# Patient Record
Sex: FEMALE | Race: OTHER | Employment: UNEMPLOYED | URBAN - METROPOLITAN AREA
[De-identification: names, ages, dates, MRNs, and addresses within clinical notes are randomized per-mention and may not be internally consistent; named-entity substitution may affect disease eponyms.]

---

## 2017-06-22 ENCOUNTER — HOSPITAL ENCOUNTER (OUTPATIENT)
Dept: LAB | Age: 57
Discharge: HOME | End: 2017-06-22

## 2017-06-22 ENCOUNTER — OFFICE VISIT (OUTPATIENT)
Dept: FAMILY MEDICINE CLINIC | Age: 57
End: 2017-06-22

## 2017-06-22 VITALS
TEMPERATURE: 97.7 F | HEIGHT: 61 IN | DIASTOLIC BLOOD PRESSURE: 71 MMHG | WEIGHT: 141 LBS | SYSTOLIC BLOOD PRESSURE: 145 MMHG | BODY MASS INDEX: 26.62 KG/M2 | HEART RATE: 49 BPM

## 2017-06-22 DIAGNOSIS — E03.9 ACQUIRED HYPOTHYROIDISM: ICD-10-CM

## 2017-06-22 LAB — TSH SERPL DL<=0.05 MIU/L-ACNC: 2.61 UIU/ML (ref 0.36–3.74)

## 2017-06-22 PROCEDURE — 84443 ASSAY THYROID STIM HORMONE: CPT | Performed by: PHYSICIAN ASSISTANT

## 2017-06-22 RX ORDER — LEVOTHYROXINE SODIUM 88 UG/1
88 TABLET ORAL
Qty: 90 TAB | Refills: 3 | Status: SHIPPED | OUTPATIENT
Start: 2017-06-22

## 2017-06-22 NOTE — PROGRESS NOTES
Patient seen for discharge with the assistance of sidney Moser. We reviewed the AVS, prescription, pharmacy location and the provider's recommendation that the patient check her blood pressure at least monthly and return to the [facility] if it is near 160/90 or if she has any other symptoms or concerns. As requested, the patient was given the provider's written list of blood pressure readings. The patient understands to return to a [facility] clinic at least yearly for follow-up.  Skinny Fuentes RN

## 2017-06-22 NOTE — PROGRESS NOTES
Assessment/Plan:    Lorren Dubin was seen today for thyroid problem. Diagnoses and all orders for this visit:    Acquired hypothyroidism  -     TSH 3RD GENERATION; Future  -     levothyroxine (LEVOTHROID) 88 mcg tablet; Take 1 Tab by mouth Daily (before breakfast). gregg 1 tableta por la boca diaria antes de desayuno        Follow-up Disposition:  Return in about 1 year (around 6/22/2018), or if symptoms worsen or fail to improve. NICKOLAS Christensen expressed understanding of this plan. An AVS was printed and given to the patient.      ----------------------------------------------------------------------    Chief Complaint   Patient presents with    Thyroid Problem     med refill       History of Present Illness:  Pt here for refill of her thyroid medication. She has not run out of her meds. She is not having dry skin, constipation, fatigue, weight loss. She is having some hair loss but it seems to be mild in nature. She is exercising daily. She is eating well. Her bp is borderline today and she will keep an eye on it by having it checked at the pharmacy or by buying a bp cuff      Past Medical History:   Diagnosis Date    Cancer [facility])     Cervical with radiation    GERD (gastroesophageal reflux disease)     Hypothyroidism     Liver disease     cyst    Pap smear for cervical cancer screening 2010    Normal       Current Outpatient Prescriptions   Medication Sig Dispense Refill    levothyroxine (LEVOTHROID) 88 mcg tablet Take 1 Tab by mouth Daily (before breakfast). gregg 1 tableta por la boca diaria antes de desayuno 90 Tab 3    naproxen (NAPROSYN) 500 mg tablet Take 1 Tab by mouth two (2) times daily (with meals).  61 Tab 0       No Known Allergies    Social History   Substance Use Topics    Smoking status: Never Smoker    Smokeless tobacco: None    Alcohol use No       Family History   Problem Relation Age of Onset    Cancer Maternal Grandfather      84yo with colon cancer    Cancer Other      maternal uncle-colon cancer at 17yo    Cancer Mother 68     stomach cancer    Asthma Mother     Heart Disease Mother     Hypertension Mother    Leighann Todd Elevated Lipids Father     Headache Father     Cancer Maternal Uncle     Bleeding Prob Neg Hx     Alcohol abuse Neg Hx     Arthritis-osteo Neg Hx     Diabetes Neg Hx     Migraines Neg Hx     Psychiatric Disorder Neg Hx     Lung Disease Neg Hx     Stroke Neg Hx     Mental Retardation Neg Hx        Physical Exam:     Visit Vitals    /71 (BP 1 Location: Right arm)    Pulse (!) 49    Temp 97.7 °F (36.5 °C) (Oral)    Ht 5' 0.63\" (1.54 m)    Wt 141 lb (64 kg)    LMP 11/22/2012    BMI 26.97 kg/m2     gen looks well, pleasant  A&Ox3  WDWN NAD  Respirations normal and non labored  Lab Results   Component Value Date/Time    TSH 0.93 10/15/2015 12:35 PM

## 2017-06-22 NOTE — PATIENT INSTRUCTIONS
Learning About High Blood Pressure  What is high blood pressure? Blood pressure is a measure of how hard the blood pushes against the walls of your arteries. It's normal for blood pressure to go up and down throughout the day, but if it stays up, you have high blood pressure. Another name for high blood pressure is hypertension. Two numbers tell you your blood pressure. The first number is the systolic pressure. It shows how hard the blood pushes when your heart is pumping. The second number is the diastolic pressure. It shows how hard the blood pushes between heartbeats, when your heart is relaxed and filling with blood. A blood pressure of less than 120/80 (say \"120 over 80\") is ideal for an adult. High blood pressure is 140/90 or higher. You have high blood pressure if your top number is 140 or higher or your bottom number is 90 or higher, or both. Many people fall into the category in between, called prehypertension. People with prehypertension need to make lifestyle changes to bring their blood pressure down and help prevent or delay high blood pressure. What happens when you have high blood pressure? · Blood flows through your arteries with too much force. Over time, this damages the walls of your arteries. But you can't feel it. High blood pressure usually doesn't cause symptoms. · Fat and calcium start to build up in your arteries. This buildup is called plaque. Plaque makes your arteries narrower and stiffer. Blood can't flow through them as easily. · This lack of good blood flow starts to damage some of the organs in your body. This can lead to problems such as coronary artery disease and heart attack, heart failure, stroke, kidney failure, and eye damage. How can you prevent high blood pressure? · Stay at a healthy weight. · Try to limit how much sodium you eat to less than 2,300 milligrams (mg) a day.  If you limit your sodium to 1,500 mg a day, you can lower your blood pressure even more.  ¨ Buy foods that are labeled \"unsalted,\" \"sodium-free,\" or \"low-sodium. \" Foods labeled \"reduced-sodium\" and \"light sodium\" may still have too much sodium. ¨ Flavor your food with garlic, lemon juice, onion, vinegar, herbs, and spices instead of salt. Do not use soy sauce, steak sauce, onion salt, garlic salt, mustard, or ketchup on your food. ¨ Use less salt (or none) when recipes call for it. You can often use half the salt a recipe calls for without losing flavor. · Be physically active. Get at least 30 minutes of exercise on most days of the week. Walking is a good choice. You also may want to do other activities, such as running, swimming, cycling, or playing tennis or team sports. · Limit alcohol to 2 drinks a day for men and 1 drink a day for women. · Eat plenty of fruits, vegetables, and low-fat dairy products. Eat less saturated and total fats. How is high blood pressure treated? · Your doctor will suggest making lifestyle changes. For example, your doctor may ask you to eat healthy foods, quit smoking, lose extra weight, and be more active. · If lifestyle changes don't help enough or your blood pressure is very high, you will have to take medicine every day. Follow-up care is a key part of your treatment and safety. Be sure to make and go to all appointments, and call your doctor if you are having problems. It's also a good idea to know your test results and keep a list of the medicines you take. Where can you learn more? Go to http://connor-joselin.info/. Enter P501 in the search box to learn more about \"Learning About High Blood Pressure. \"  Current as of: March 23, 2016  Content Version: 11.3  © 5159-0760 [employer]. Care instructions adapted under license by [employer] (which disclaims liability or warranty for this information).  If you have questions about a medical condition or this instruction, always ask your healthcare professional. [employer] disclaims any warranty or liability for your use of this information.

## 2017-06-22 NOTE — PROGRESS NOTES
Coordination of Care  1. Have you been to the ER, urgent care clinic since your last visit? Hospitalized since your last visit? No    2. Have you seen or consulted any other health care providers outside of the [address] [place] [address] since your last visit? Include any pap smears or colon screening. No    Medications  Medication Reconciliation Performed: no  Patient does need refills     Learning Assessment Complete?  yes

## 2017-08-01 ENCOUNTER — DOCUMENTATION ONLY (OUTPATIENT)
Dept: FAMILY MEDICINE CLINIC | Age: 57
End: 2017-08-01

## 2017-08-01 NOTE — PROGRESS NOTES
Telephone call to the pt to find out what happened with her breast surgery referral. She tells me that she never went because she couldn't provide paperwork to the  to get the appt made. She was quite confused however on the phone and repeatedly gave me her birthdate as 58 but did repeat her correct address. After I got my PCT (native Korean speaker) to talk to the pt, she confirmed that she was the correct pt. The pt actually had been approved by access now (see notes) but now it is , unfortunately after she went through the process she never called for an appt .  I have given the pt [facility] number to get another mammo ASAP and will ask nurse to follow up with her to ensure that she is getting what she needs

## 2017-08-02 ENCOUNTER — TELEPHONE (OUTPATIENT)
Dept: FAMILY MEDICINE CLINIC | Age: 57
End: 2017-08-02

## 2017-08-02 NOTE — TELEPHONE ENCOUNTER
Attempted calling the pt to see if she had made an appt for mammogram with [facility] or if not, assisting the pt to make the appt for mammo. The call was made with the assistance of Namshi. #[id number]. There was no answer. A message was left to return the call to the [facility] office and ask to speak with the nurse. Attempted to call the pt's emergency contact who is listed as her spouse, and who is on the PHI. There was no answer and the VM that came on was not the correct person. The number that was dialed was verified with the [employer] . The spouses number is not correct as listed in the pt's chart.  Mendy Gan RN

## 2017-08-07 NOTE — TELEPHONE ENCOUNTER
Ingris Ferns, [place]   Female, 61 y.o., 1960  Weight:   141 lb (64 kg)  Phone:   [phone]  PCP:   Provider Unknown  MRN:   [id number]  MyChart:   Code Exp  Next Appt:   2017    Patient Calls            ERICKA Holly   [facility] 6 days ago                 Please help by making sure that the pt is scheduled with [facility] to get a mammo ASAP (Routing comment)                        ERICKA Cortes 6 days ago                 Telephone call to the pt to find out what happened with her breast surgery referral. She tells me that she never went because she couldn't provide paperwork to the  to get the appt made. She was quite confused however on the phone and repeatedly gave me her birthdate as 58 but did repeat her correct address. After I got my PCT (native Polish speaker) to talk to the pt, she confirmed that she was the correct pt. The pt actually had been approved by access now (see notes) but now it is , unfortunately after she went through the process she never called for an appt .  I have given the pt [facility] number to get another mammo ASAP and will ask nurse to follow up with her to ensure that she is getting what she needs                   Documentation

## 2017-08-11 ENCOUNTER — OFFICE VISIT (OUTPATIENT)
Dept: FAMILY PLANNING/WOMEN'S HEALTH CLINIC | Age: 57
End: 2017-08-11

## 2017-08-11 ENCOUNTER — HOSPITAL ENCOUNTER (OUTPATIENT)
Dept: ULTRASOUND IMAGING | Age: 57
Discharge: HOME | End: 2017-08-11
Attending: FAMILY MEDICINE
Payer: SELF-PAY

## 2017-08-11 ENCOUNTER — HOSPITAL ENCOUNTER (OUTPATIENT)
Dept: MAMMOGRAPHY | Age: 57
Discharge: HOME | End: 2017-08-11
Attending: FAMILY MEDICINE
Payer: SELF-PAY

## 2017-08-11 VITALS — SYSTOLIC BLOOD PRESSURE: 139 MMHG | DIASTOLIC BLOOD PRESSURE: 74 MMHG

## 2017-08-11 DIAGNOSIS — Z01.419 WELL WOMAN EXAM: Primary | ICD-10-CM

## 2017-08-11 DIAGNOSIS — Z12.31 VISIT FOR SCREENING MAMMOGRAM: ICD-10-CM

## 2017-08-11 DIAGNOSIS — N63.20 LEFT BREAST LUMP: ICD-10-CM

## 2017-08-11 PROCEDURE — 76642 ULTRASOUND BREAST LIMITED: CPT

## 2017-08-11 PROCEDURE — 77066 DX MAMMO INCL CAD BI: CPT

## 2017-08-11 NOTE — PROGRESS NOTES
HISTORY OF PRESENT ILLNESS  Eugene Rainey is a 61 y.o. female. HPI Comments: For [employer]. H/o hyst for ? bleeding, also reports h/o what sounds like cervical cancer. Also had abnl left mammo 12/15 and she never got the bx that was recommended. [facility]         ROS    Physical Exam   Pulmonary/Chest:       1cm smooth round mildly tender mass at about 3 o'clock left breast, no axillary masses. Genitourinary:   Genitourinary Comments: Atrophic vaginal mucosa, no visible cervix, vag cuff palpable, not well-opposed. No ovarian masses, no pelvic scar. ASSESSMENT and PLAN  Breast mass, needs diagnostic mammo. H/o hyst and ?h/o cervical ca, no Pap needed.

## 2017-08-11 NOTE — PROGRESS NOTES
EVERY WOMANS LIFE HISTORY QUESTIONNAIRE       No Yes Comments   Has a doctor ever seen or felt anything wrong with your breast? []                                  [x]                                  [facility] 2015   Have you ever had a breast biopsy? [x]                                  []                                  Although was recommended in 2015        When and where was last mammogram performed? 12/2015---[facility]    Have you ever been told that there was a problem on your mammogram?   No Yes Comments   []                                  [x]                                  [facility]     Do you have breast implants? No Yes Comments   [x]                                  []                                       When was your last Pap test performed? 11/2015 at [facility] you ever had an abnormal Pap test?   No Yes Comments   []                                  [x]                                  ? Hx of cervical cancer     Have you had a hysterectomy? No Yes Comments (why)   []                                  [x]                                  8 yrs ago in [facility] for ? Cervical issues/cancer     Have you ever been diagnosed with any type of Cancer   No Yes Comments (type,when,where,type of treatment   []                                  [x]                                  ? Cervical cancer and hysterectomy done in [facility]        Has a family member been diagnosed with breast or ovarian cancer? No Yes Comments (which family members, and type   [x]                                  []                                       Did your mother take JOSIANE? No Yes Unknown   []                                  []                                  X     Do you have a history of HIV exposure? No Yes    [x]                                  []                                         Have you been through menopause?    No Yes Date of LMP   []                                  [x]                                  8 yrs ago/hysterectomy     Are you taking hormone replacement therapy (HRT)     No Yes Comments   [x]                                  []                                       How many times have you been pregnant? 3     Number of live births ? 3    Are you experiencing any of the following? No Yes Comments   Nipple Discharge [x]                                  []                                     Breast Lump/Masses [x]                                  []                                     Breast Skin Changes [x]                                  []                                          No Yes Comments   Vaginal Discharge [x]                                  []                                     Abnormal/unusual vaginal bleeding [x]                                  []                                         Are you experiencing any other health problems?     None--followed at [facility]

## 2017-08-14 ENCOUNTER — TELEPHONE (OUTPATIENT)
Dept: FAMILY PLANNING/WOMEN'S HEALTH CLINIC | Age: 57
End: 2017-08-14

## 2017-08-14 NOTE — TELEPHONE ENCOUNTER
Appt made for pt for breast biopsy with the [facility] re: abnormal mammogram. Spoke to pt and gave her appt information via [facility] . Appt is 8/21/17.

## 2017-08-21 ENCOUNTER — OFFICE VISIT (OUTPATIENT)
Dept: SURGERY | Age: 57
End: 2017-08-21

## 2017-08-21 ENCOUNTER — HOSPITAL ENCOUNTER (OUTPATIENT)
Dept: LAB | Age: 57
Discharge: HOME | End: 2017-08-21

## 2017-08-21 VITALS
HEIGHT: 60 IN | SYSTOLIC BLOOD PRESSURE: 122 MMHG | BODY MASS INDEX: 27.68 KG/M2 | HEART RATE: 55 BPM | DIASTOLIC BLOOD PRESSURE: 56 MMHG | WEIGHT: 141 LBS

## 2017-08-21 DIAGNOSIS — N63.20 MASS OF BREAST, LEFT: Primary | ICD-10-CM

## 2017-08-21 PROCEDURE — 88342 IMHCHEM/IMCYTCHM 1ST ANTB: CPT | Performed by: SURGERY

## 2017-08-21 NOTE — LETTER
2017 11:46 AM 
 
Patient:  James Donohue YOB: 1960 Date of Visit: 2017 Dear Dr. Gisele Valdes: 
 
Thank you for referring Ms. James Donohue to me for evaluation/treatment. Below are the relevant portions of my assessment and plan of care. HISTORY OF PRESENT ILLNESS James Donohue is a 61 y.o. female. HPI 
NEW patient presents for consultation at the request of Dr. Otto Scott for abnormal LEFT mammogram and ultrasound. History is taken through [place], , on our blue phones. She says that she can feel a mass \"off and on,\" but does not feel it now. Sounds like it is positional.  Has no pain, no nipple discharge/retraction or skin change. She has never had a breast biopsy. There is no FH of breast or ovarian cancer that she knows of. She has had cervical cancer in the past. 
 
BILATERAL diagnostic mammogram and LEFT breast ultrasound 17 at [facility], BIRADS [facility], suspicious. Past Medical History:  
Diagnosis Date  Breast lump   
 left breast  
 Cancer ([address] Cervical with radiation  GERD (gastroesophageal reflux disease)  Hypothyroidism  Liver disease   
 cyst  
 Pap smear for cervical cancer screening  Normal  
 
 
Past Surgical History:  
Procedure Laterality Date  HX  SECTION    
 x 2  
 HX PARTIAL THYROIDECTOMY Right  HX TUBAL LIGATION Social History Social History  Marital status:  Spouse name: N/A  
 Number of children: N/A  
 Years of education: N/A Occupational History  Not on file. Social History Main Topics  Smoking status: Never Smoker  Smokeless tobacco: Never Used  Alcohol use No  
 Drug use: No  
 Sexual activity: Not on file Other Topics Concern  Not on file Social History Narrative ** Merged History Encounter **  
    
 
 
Current Outpatient Prescriptions on File Prior to Visit Medication Sig Dispense Refill  levothyroxine (LEVOTHROID) 88 mcg tablet Take 1 Tab by mouth Daily (before breakfast). gregg 1 tableta por la boca diaria antes de desayuno 90 Tab 3  
 naproxen (NAPROSYN) 500 mg tablet Take 1 Tab by mouth two (2) times daily (with meals). 60 Tab 0 No current facility-administered medications on file prior to visit. No Known Allergies OB History Obstetric Comments Menarche:  14. LMP: 38.  # of Children:  3. Age at Delivery of First Child:  24.   Hysterectomy/oophorectomy:  ? NO/NO. Breast Bx:  No.  Hx of Breast Feeding:  Yes. BCP:  Yes, in the past . Hormone therapy:  No.  
  
  
 
 
ROS Constitutional: Negative HENT: Negative. Eyes: Negative. Respiratory: Negative. Cardiovascular: Negative. Gastrointestinal: Negative. Genitourinary: Negative. Musculoskeletal: Negative. Skin: Negative. Neurological: Negative. Endo/Heme/Allergies: Negative. Psychiatric/Behavioral: Negative. Physical Exam  
Cardiovascular: Normal rate and normal heart sounds. Pulmonary/Chest: Breath sounds normal. Right breast exhibits no inverted nipple, no mass, no nipple discharge, no skin change and no tenderness. Left breast exhibits no inverted nipple, no mass, no nipple discharge, no skin change and no tenderness. Breasts are symmetrical.  
 
 
Lymphadenopathy:  
     Right cervical: No superficial cervical, no deep cervical and no posterior cervical adenopathy present. Left cervical: No superficial cervical, no deep cervical and no posterior cervical adenopathy present. Right axillary: No pectoral and no lateral adenopathy present. Left axillary: No pectoral and no lateral adenopathy present. BREAST ULTRASOUND Indication: left breast mass 3:00 Technique: The area was scanned using a high-frequency linear-array near-field transducer Findings: 1.2 x 1.5 x 1.8 cm lobulated solid mass, through transmission Impression: Indeterminate mass Disposition: US-guided needle core biopsy US - Guided Core Biopsy Following detailed explanation and  description of the Biopsy procedure, its risk, benefits and possible alternatives, the patient signed the informed consent. Indication : Mass, Ultrasound Visible, leftBreast3 o'clock Prep : We cleansed the skin with alcohol. Anesthesia : We anesthetized the skin and underlying tissues with 1% lidocaine with epinephrine. Device : We advanced the [employer] device through the lesion and captured tissue with real-time Ultrasound Confirmation. Core Sampling : We repeated this sampling for the following number of cores, 2. Marker : We placed a marking clip to merari the biopsy site. Marker Type : SENOMARK. Dressing : We then closed the incision with steristrips and placed a sterile dressing. Instructions : The patient was instructed regarding post-procedure care and activities. Pathology : Pending at this time. Patient tolerated procedure well and discharged in stable condition. Informed patient that they will be notified of pathology results in 3 to 5 days. ASSESSMENT and PLAN 
  ICD-10-CM ICD-9-CM 1. Breast mass, right N63 611.72 Pt presents with LEFT breast mass. After reviewing previous mammo and today's US, biopsied site today without complications. Pt tolerated this procedure well. Will f/u once path results become available. This plan was reviewed with the patient through an  Maryana Peat #[id number]) and patient agrees. All questions were answered. Written by Jay Vallecillo, as dictated by Dr. Kari Corrigan MD.  
 
If you have questions, please do not hesitate to call me. I look forward to following Ms. Farooq Mcdonald along with you.  
 
 
 
Sincerely, 
 
 
Lizbet Zurita MD

## 2017-08-21 NOTE — PROGRESS NOTES
HISTORY OF PRESENT ILLNESS  Janet Stevenson is a 61 y.o. female. HPI    NEW patient presents for consultation at the request of Dr. Francesca Isaacs for abnormal LEFT mammogram and ultrasound. History is taken through [place], , on our blue phones. She says that she can feel a mass \"off and on,\" but does not feel it now. Sounds like it is positional.  Has no pain, no nipple discharge/retraction or skin change. She has never had a breast biopsy. There is no FH of breast or ovarian cancer that she knows of. She has had cervical cancer in the past.  BILATERAL diagnostic mammogram and LEFT breast ultrasound 8/11/17 at [facility], BIRADS [facility], suspicious. Review of Systems   Constitutional: Negative. HENT: Negative. Eyes: Negative. Respiratory: Negative. Cardiovascular: Negative. Gastrointestinal: Negative. Genitourinary: Negative. Musculoskeletal: Negative. Skin: Negative. Neurological: Negative. Endo/Heme/Allergies: Negative. Psychiatric/Behavioral: Negative.         Physical Exam    ASSESSMENT and PLAN  {ASSESSMENT/PLAN:[id number]}

## 2017-08-21 NOTE — PROGRESS NOTES
HISTORY OF PRESENT ILLNESS  Julio César Galdamez is a 61 y.o. female. HPI  NEW patient presents for consultation at the request of Dr. Ruma Cordoba for abnormal LEFT mammogram and ultrasound. History is taken through [place], , on our blue phones. She says that she can feel a mass \"off and on,\" but does not feel it now. Sounds like it is positional.  Has no pain, no nipple discharge/retraction or skin change. She has never had a breast biopsy. There is no FH of breast or ovarian cancer that she knows of. She has had cervical cancer in the past.    BILATERAL diagnostic mammogram and LEFT breast ultrasound 17 at [facility] [place], BIRADS 4, suspicious. Past Medical History:   Diagnosis Date    Breast lump     left breast    Cancer (HCC)     Cervical with radiation    GERD (gastroesophageal reflux disease)     Hypothyroidism     Liver disease     cyst    Pap smear for cervical cancer screening     Normal       Past Surgical History:   Procedure Laterality Date    HX  SECTION      x 2    HX PARTIAL THYROIDECTOMY Right     HX TUBAL LIGATION         Social History     Social History    Marital status:      Spouse name: N/A    Number of children: N/A    Years of education: N/A     Occupational History    Not on file. Social History Main Topics    Smoking status: Never Smoker    Smokeless tobacco: Never Used    Alcohol use No    Drug use: No    Sexual activity: Not on file     Other Topics Concern    Not on file     Social History Narrative    ** Merged History Encounter **            Current Outpatient Prescriptions on File Prior to Visit   Medication Sig Dispense Refill    levothyroxine (LEVOTHROID) 88 mcg tablet Take 1 Tab by mouth Daily (before breakfast). gregg 1 tableta por la boca diaria antes de desayuno 90 Tab 3    naproxen (NAPROSYN) 500 mg tablet Take 1 Tab by mouth two (2) times daily (with meals).  60 Tab 0     No current facility-administered medications on file prior to visit. No Known Allergies    OB History     Obstetric Comments    Menarche:  14. LMP: 38.  # of Children:  3. Age at Delivery of First Child:  24.   Hysterectomy/oophorectomy:  ? NO/NO. Breast Bx:  No.  Hx of Breast Feeding:  Yes. BCP:  Yes, in the past . Hormone therapy:  No.             ROS  Constitutional: Negative    HENT: Negative. Eyes: Negative. Respiratory: Negative. Cardiovascular: Negative. Gastrointestinal: Negative. Genitourinary: Negative. Musculoskeletal: Negative. Skin: Negative. Neurological: Negative. Endo/Heme/Allergies: Negative. Psychiatric/Behavioral: Negative. Physical Exam   Cardiovascular: Normal rate and normal heart sounds. Pulmonary/Chest: Breath sounds normal. Right breast exhibits no inverted nipple, no mass, no nipple discharge, no skin change and no tenderness. Left breast exhibits no inverted nipple, no mass, no nipple discharge, no skin change and no tenderness. Breasts are symmetrical.       Lymphadenopathy:        Right cervical: No superficial cervical, no deep cervical and no posterior cervical adenopathy present. Left cervical: No superficial cervical, no deep cervical and no posterior cervical adenopathy present. Right axillary: No pectoral and no lateral adenopathy present. Left axillary: No pectoral and no lateral adenopathy present. BREAST ULTRASOUND  Indication: left breast mass 3:00  Technique: The area was scanned using a high-frequency linear-array near-field transducer  Findings: 1.2 x 1.5 x 1.8 cm lobulated solid mass, through transmission  Impression: Indeterminate mass  Disposition: US-guided needle core biopsy    US - Guided Core Biopsy  Following detailed explanation and  description of the Biopsy procedure, its risk, benefits and possible alternatives, the patient signed the informed consent. Indication : Mass, Ultrasound Visible, leftBreast3 o'clock   Prep :  We cleansed the skin with alcohol. Anesthesia : We anesthetized the skin and underlying tissues with 1% lidocaine with epinephrine. Device : We advanced the BARD Marquee device through the lesion and captured tissue with real-time Ultrasound Confirmation. Core Sampling : We repeated this sampling for the following number of cores, 2. Marker : We placed a marking clip to merari the biopsy site. Marker Type : SENOMARK. Dressing : We then closed the incision with steristrips and placed a sterile dressing. Instructions : The patient was instructed regarding post-procedure care and activities. Pathology : Pending at this time. Patient tolerated procedure well and discharged in stable condition. Informed patient that they will be notified of pathology results in 3 to 5 days. ASSESSMENT and PLAN    ICD-10-CM ICD-9-CM    1. Breast mass, right N63 611.72      Pt presents with LEFT breast mass. After reviewing previous mammo and today's US, biopsied site today without complications. Pt tolerated this procedure well. Will f/u once path results become available. This plan was reviewed with the patient through an  [facility] #[id number]) and patient agrees. All questions were answered.     Written by Yasmin Neumann, as dictated by Dr. Sandra Guerrero MD.

## 2017-08-21 NOTE — COMMUNICATION BODY
HISTORY OF PRESENT ILLNESS  Brandee Kearns is a 61 y.o. female. HPI  NEW patient presents for consultation at the request of Dr. Ophelia Baig for abnormal LEFT mammogram and ultrasound. History is taken through [place], , on our blue phones. She says that she can feel a mass \"off and on,\" but does not feel it now. Sounds like it is positional.  Has no pain, no nipple discharge/retraction or skin change. She has never had a breast biopsy. There is no FH of breast or ovarian cancer that she knows of. She has had cervical cancer in the past.    BILATERAL diagnostic mammogram and LEFT breast ultrasound 17 at [facility] [place], BIRADS 4, suspicious. Past Medical History:   Diagnosis Date    Breast lump     left breast    Cancer (HCC)     Cervical with radiation    GERD (gastroesophageal reflux disease)     Hypothyroidism     Liver disease     cyst    Pap smear for cervical cancer screening     Normal       Past Surgical History:   Procedure Laterality Date    HX  SECTION      x 2    HX PARTIAL THYROIDECTOMY Right     HX TUBAL LIGATION         Social History     Social History    Marital status:      Spouse name: N/A    Number of children: N/A    Years of education: N/A     Occupational History    Not on file. Social History Main Topics    Smoking status: Never Smoker    Smokeless tobacco: Never Used    Alcohol use No    Drug use: No    Sexual activity: Not on file     Other Topics Concern    Not on file     Social History Narrative    ** Merged History Encounter **            Current Outpatient Prescriptions on File Prior to Visit   Medication Sig Dispense Refill    levothyroxine (LEVOTHROID) 88 mcg tablet Take 1 Tab by mouth Daily (before breakfast). gregg 1 tableta por la boca diaria antes de desayuno 90 Tab 3    naproxen (NAPROSYN) 500 mg tablet Take 1 Tab by mouth two (2) times daily (with meals).  60 Tab 0     No current facility-administered medications on file prior to visit. No Known Allergies    OB History     Obstetric Comments    Menarche:  14. LMP: 38.  # of Children:  3. Age at Delivery of First Child:  24.   Hysterectomy/oophorectomy:  ? NO/NO. Breast Bx:  No.  Hx of Breast Feeding:  Yes. BCP:  Yes, in the past . Hormone therapy:  No.             ROS  Constitutional: Negative    HENT: Negative. Eyes: Negative. Respiratory: Negative. Cardiovascular: Negative. Gastrointestinal: Negative. Genitourinary: Negative. Musculoskeletal: Negative. Skin: Negative. Neurological: Negative. Endo/Heme/Allergies: Negative. Psychiatric/Behavioral: Negative. Physical Exam   Cardiovascular: Normal rate and normal heart sounds. Pulmonary/Chest: Breath sounds normal. Right breast exhibits no inverted nipple, no mass, no nipple discharge, no skin change and no tenderness. Left breast exhibits no inverted nipple, no mass, no nipple discharge, no skin change and no tenderness. Breasts are symmetrical.       Lymphadenopathy:        Right cervical: No superficial cervical, no deep cervical and no posterior cervical adenopathy present. Left cervical: No superficial cervical, no deep cervical and no posterior cervical adenopathy present. Right axillary: No pectoral and no lateral adenopathy present. Left axillary: No pectoral and no lateral adenopathy present. BREAST ULTRASOUND  Indication: left breast mass 3:00  Technique: The area was scanned using a high-frequency linear-array near-field transducer  Findings: 1.2 x 1.5 x 1.8 cm lobulated solid mass, through transmission  Impression: Indeterminate mass  Disposition: US-guided needle core biopsy    US - Guided Core Biopsy  Following detailed explanation and  description of the Biopsy procedure, its risk, benefits and possible alternatives, the patient signed the informed consent. Indication : Mass, Ultrasound Visible, leftBreast3 o'clock   Prep :  We cleansed the skin with alcohol. Anesthesia : We anesthetized the skin and underlying tissues with 1% lidocaine with epinephrine. Device : We advanced the BARD Marquee device through the lesion and captured tissue with real-time Ultrasound Confirmation. Core Sampling : We repeated this sampling for the following number of cores, 2. Marker : We placed a marking clip to merari the biopsy site. Marker Type : SENOMARK. Dressing : We then closed the incision with steristrips and placed a sterile dressing. Instructions : The patient was instructed regarding post-procedure care and activities. Pathology : Pending at this time. Patient tolerated procedure well and discharged in stable condition. Informed patient that they will be notified of pathology results in 3 to 5 days. ASSESSMENT and PLAN    ICD-10-CM ICD-9-CM    1. Breast mass, right N63 611.72      Pt presents with LEFT breast mass. After reviewing previous mammo and today's US, biopsied site today without complications. Pt tolerated this procedure well. Will f/u once path results become available. This plan was reviewed with the patient through an  [facility] #[id number]) and patient agrees. All questions were answered.     Written by Manny Jones, as dictated by Dr. Stephanie Dobson MD.

## 2017-08-23 ENCOUNTER — DOCUMENTATION ONLY (OUTPATIENT)
Dept: SURGERY | Age: 57
End: 2017-08-23

## 2017-08-23 NOTE — PROGRESS NOTES
[facility] BREAST [facility]  OFFICE PROCEDURE PROGRESS NOTE        Chart reviewed for the following:   IMichael MD, have reviewed the History, Physical and updated the Allergic reactions for [address] performed immediately prior to start of procedure:   Rodrick Pal MD, have performed the following reviews on [address] [place] [address] prior to the start of the procedure:            * Patient was identified by name and date of birth   * Agreement on procedure being performed was verified  * Risks and Benefits explained to the patient  * Procedure site verified and marked as necessary  * Patient was positioned for comfort  * Consent was signed and verified     Time:  10:00 am       Date of procedure:   8/21/17    Procedure performed by:  Wilner Guzman MD    Provider assisted by:   Keily Gandara RN    Patient assisted by: self    How tolerated by patient: tolerated the procedure well with no complications    Post Procedural Pain Scale: 0 - No Hurt    Comments:   Written and verbal post biopsy instructions reviewed with and given to patient with her understanding.

## 2017-08-25 ENCOUNTER — TELEPHONE (OUTPATIENT)
Dept: FAMILY MEDICINE CLINIC | Age: 57
End: 2017-08-25

## 2017-08-29 ENCOUNTER — TELEPHONE (OUTPATIENT)
Dept: SURGERY | Age: 57
End: 2017-08-29

## 2017-09-12 ENCOUNTER — TELEPHONE (OUTPATIENT)
Dept: FAMILY PLANNING/WOMEN'S HEALTH CLINIC | Age: 57
End: 2017-09-12

## 2021-08-03 PROBLEM — K21.9 GERD (GASTROESOPHAGEAL REFLUX DISEASE): Status: RESOLVED | Noted: 2021-08-03 | Resolved: 2021-08-03

## 2021-08-30 ENCOUNTER — HOSPITAL ENCOUNTER (OUTPATIENT)
Dept: LAB | Age: 61
Discharge: HOME | End: 2021-08-30

## 2021-08-30 PROCEDURE — 87186 SC STD MICRODIL/AGAR DIL: CPT

## 2021-08-30 PROCEDURE — 85652 RBC SED RATE AUTOMATED: CPT

## 2021-08-30 PROCEDURE — 87077 CULTURE AEROBIC IDENTIFY: CPT

## 2021-08-30 PROCEDURE — 87086 URINE CULTURE/COLONY COUNT: CPT

## 2021-08-30 PROCEDURE — 86140 C-REACTIVE PROTEIN: CPT

## 2021-08-31 LAB
CRP SERPL-MCNC: 0.71 MG/DL (ref 0–0.6)
ERYTHROCYTE [SEDIMENTATION RATE] IN BLOOD: 22 MM/HR (ref 0–30)

## 2021-09-02 LAB
BACTERIA SPEC CULT: ABNORMAL
CC UR VC: ABNORMAL
SERVICE CMNT-IMP: ABNORMAL

## 2021-09-16 ENCOUNTER — HOSPITAL ENCOUNTER (OUTPATIENT)
Dept: LAB | Age: 61
Discharge: HOME | End: 2021-09-16

## 2021-09-16 PROCEDURE — 81001 URINALYSIS AUTO W/SCOPE: CPT

## 2021-09-16 PROCEDURE — 87086 URINE CULTURE/COLONY COUNT: CPT

## 2021-09-17 LAB
APPEARANCE UR: ABNORMAL
BACTERIA URNS QL MICRO: ABNORMAL /HPF
BILIRUB UR QL: NEGATIVE
COLOR UR: ABNORMAL
EPITH CASTS URNS QL MICRO: ABNORMAL /LPF
GLUCOSE UR STRIP.AUTO-MCNC: NEGATIVE MG/DL
HGB UR QL STRIP: ABNORMAL
HYALINE CASTS URNS QL MICRO: ABNORMAL /LPF (ref 0–5)
KETONES UR QL STRIP.AUTO: NEGATIVE MG/DL
LEUKOCYTE ESTERASE UR QL STRIP.AUTO: NEGATIVE
NITRITE UR QL STRIP.AUTO: NEGATIVE
PH UR STRIP: 5.5 [PH] (ref 5–8)
PROT UR STRIP-MCNC: NEGATIVE MG/DL
RBC #/AREA URNS HPF: ABNORMAL /HPF (ref 0–5)
SP GR UR REFRACTOMETRY: 1.02 (ref 1–1.03)
UROBILINOGEN UR QL STRIP.AUTO: 0.2 EU/DL (ref 0.2–1)
WBC URNS QL MICRO: ABNORMAL /HPF (ref 0–4)

## 2021-09-18 LAB
BACTERIA SPEC CULT: NORMAL
SERVICE CMNT-IMP: NORMAL

## 2022-07-18 ENCOUNTER — HOSPITAL ENCOUNTER (OUTPATIENT)
Dept: LAB | Age: 62
Discharge: HOME | End: 2022-07-18

## 2022-07-18 PROCEDURE — 85025 COMPLETE CBC W/AUTO DIFF WBC: CPT

## 2022-07-18 PROCEDURE — 80061 LIPID PANEL: CPT

## 2022-07-18 PROCEDURE — 80053 COMPREHEN METABOLIC PANEL: CPT

## 2022-07-18 PROCEDURE — 83036 HEMOGLOBIN GLYCOSYLATED A1C: CPT

## 2022-07-19 LAB
ALBUMIN SERPL-MCNC: 3.9 G/DL (ref 3.5–5)
ALBUMIN/GLOB SERPL: 1 {RATIO} (ref 1.1–2.2)
ALP SERPL-CCNC: 119 U/L (ref 45–117)
ALT SERPL-CCNC: 40 U/L (ref 12–78)
ANION GAP SERPL CALC-SCNC: 6 MMOL/L (ref 5–15)
AST SERPL-CCNC: 26 U/L (ref 15–37)
BASOPHILS # BLD: 0.1 K/UL (ref 0–0.1)
BASOPHILS NFR BLD: 1 % (ref 0–1)
BILIRUB SERPL-MCNC: 0.4 MG/DL (ref 0.2–1)
BUN SERPL-MCNC: 16 MG/DL (ref 6–20)
BUN/CREAT SERPL: 22 (ref 12–20)
CALCIUM SERPL-MCNC: 9.7 MG/DL (ref 8.5–10.1)
CHLORIDE SERPL-SCNC: 106 MMOL/L (ref 97–108)
CHOLEST SERPL-MCNC: 245 MG/DL
CO2 SERPL-SCNC: 30 MMOL/L (ref 21–32)
CREAT SERPL-MCNC: 0.73 MG/DL (ref 0.55–1.02)
DIFFERENTIAL METHOD BLD: NORMAL
EOSINOPHIL # BLD: 0.1 K/UL (ref 0–0.4)
EOSINOPHIL NFR BLD: 2 % (ref 0–7)
ERYTHROCYTE [DISTWIDTH] IN BLOOD BY AUTOMATED COUNT: 13.8 % (ref 11.5–14.5)
EST. AVERAGE GLUCOSE BLD GHB EST-MCNC: 111 MG/DL
GLOBULIN SER CALC-MCNC: 3.9 G/DL (ref 2–4)
GLUCOSE SERPL-MCNC: 91 MG/DL (ref 65–100)
HBA1C MFR BLD: 5.5 % (ref 4–5.6)
HCT VFR BLD AUTO: 43.7 % (ref 35–47)
HDLC SERPL-MCNC: 73 MG/DL
HDLC SERPL: 3.4 {RATIO} (ref 0–5)
HGB BLD-MCNC: 13.8 G/DL (ref 11.5–16)
IMM GRANULOCYTES # BLD AUTO: 0 K/UL (ref 0–0.04)
IMM GRANULOCYTES NFR BLD AUTO: 0 % (ref 0–0.5)
LDLC SERPL CALC-MCNC: 134 MG/DL (ref 0–100)
LYMPHOCYTES # BLD: 2.3 K/UL (ref 0.8–3.5)
LYMPHOCYTES NFR BLD: 36 % (ref 12–49)
MCH RBC QN AUTO: 29.8 PG (ref 26–34)
MCHC RBC AUTO-ENTMCNC: 31.6 G/DL (ref 30–36.5)
MCV RBC AUTO: 94.4 FL (ref 80–99)
MONOCYTES # BLD: 0.6 K/UL (ref 0–1)
MONOCYTES NFR BLD: 9 % (ref 5–13)
NEUTS SEG # BLD: 3.3 K/UL (ref 1.8–8)
NEUTS SEG NFR BLD: 52 % (ref 32–75)
NRBC # BLD: 0 K/UL (ref 0–0.01)
NRBC BLD-RTO: 0 PER 100 WBC
PLATELET # BLD AUTO: 163 K/UL (ref 150–400)
POTASSIUM SERPL-SCNC: 4.6 MMOL/L (ref 3.5–5.1)
PROT SERPL-MCNC: 7.8 G/DL (ref 6.4–8.2)
RBC # BLD AUTO: 4.63 M/UL (ref 3.8–5.2)
SODIUM SERPL-SCNC: 142 MMOL/L (ref 136–145)
TRIGL SERPL-MCNC: 190 MG/DL (ref ?–150)
VLDLC SERPL CALC-MCNC: 38 MG/DL
WBC # BLD AUTO: 6.4 K/UL (ref 3.6–11)

## 2022-08-30 ENCOUNTER — HOSPITAL ENCOUNTER (OUTPATIENT)
Dept: LAB | Age: 62
Discharge: HOME | End: 2022-08-30

## 2022-08-30 LAB — TSH SERPL DL<=0.05 MIU/L-ACNC: 0.65 UIU/ML (ref 0.36–3.74)

## 2022-08-30 PROCEDURE — 87624 HPV HI-RISK TYP POOLED RSLT: CPT

## 2022-08-30 PROCEDURE — 84443 ASSAY THYROID STIM HORMONE: CPT

## 2022-08-30 PROCEDURE — 88142 CYTOPATH C/V THIN LAYER: CPT

## 2022-10-05 ENCOUNTER — HOSPITAL ENCOUNTER (OUTPATIENT)
Dept: LAB | Age: 62
Discharge: HOME | End: 2022-10-05

## 2022-10-05 PROCEDURE — 36415 COLL VENOUS BLD VENIPUNCTURE: CPT

## 2022-10-05 PROCEDURE — 80053 COMPREHEN METABOLIC PANEL: CPT

## 2022-10-05 PROCEDURE — 85025 COMPLETE CBC W/AUTO DIFF WBC: CPT

## 2022-10-06 LAB
ALBUMIN SERPL-MCNC: 3.8 G/DL (ref 3.5–5)
ALBUMIN/GLOB SERPL: 1 {RATIO} (ref 1.1–2.2)
ALP SERPL-CCNC: 126 U/L (ref 45–117)
ALT SERPL-CCNC: 50 U/L (ref 12–78)
ANION GAP SERPL CALC-SCNC: 7 MMOL/L (ref 5–15)
AST SERPL-CCNC: 30 U/L (ref 15–37)
BASOPHILS # BLD: 0.1 K/UL (ref 0–0.1)
BASOPHILS NFR BLD: 1 % (ref 0–1)
BILIRUB SERPL-MCNC: 0.4 MG/DL (ref 0.2–1)
BUN SERPL-MCNC: 11 MG/DL (ref 6–20)
BUN/CREAT SERPL: 16 (ref 12–20)
CALCIUM SERPL-MCNC: 9.7 MG/DL (ref 8.5–10.1)
CHLORIDE SERPL-SCNC: 106 MMOL/L (ref 97–108)
CO2 SERPL-SCNC: 28 MMOL/L (ref 21–32)
CREAT SERPL-MCNC: 0.68 MG/DL (ref 0.55–1.02)
DIFFERENTIAL METHOD BLD: NORMAL
EOSINOPHIL # BLD: 0.1 K/UL (ref 0–0.4)
EOSINOPHIL NFR BLD: 1 % (ref 0–7)
ERYTHROCYTE [DISTWIDTH] IN BLOOD BY AUTOMATED COUNT: 13.1 % (ref 11.5–14.5)
GLOBULIN SER CALC-MCNC: 3.8 G/DL (ref 2–4)
GLUCOSE SERPL-MCNC: 82 MG/DL (ref 65–100)
HCT VFR BLD AUTO: 42 % (ref 35–47)
HGB BLD-MCNC: 13.4 G/DL (ref 11.5–16)
IMM GRANULOCYTES # BLD AUTO: 0 K/UL (ref 0–0.04)
IMM GRANULOCYTES NFR BLD AUTO: 0 % (ref 0–0.5)
LYMPHOCYTES # BLD: 2.3 K/UL (ref 0.8–3.5)
LYMPHOCYTES NFR BLD: 30 % (ref 12–49)
MCH RBC QN AUTO: 30 PG (ref 26–34)
MCHC RBC AUTO-ENTMCNC: 31.9 G/DL (ref 30–36.5)
MCV RBC AUTO: 94 FL (ref 80–99)
MONOCYTES # BLD: 0.8 K/UL (ref 0–1)
MONOCYTES NFR BLD: 11 % (ref 5–13)
NEUTS SEG # BLD: 4.4 K/UL (ref 1.8–8)
NEUTS SEG NFR BLD: 57 % (ref 32–75)
NRBC # BLD: 0 K/UL (ref 0–0.01)
NRBC BLD-RTO: 0 PER 100 WBC
PLATELET # BLD AUTO: 174 K/UL (ref 150–400)
POTASSIUM SERPL-SCNC: 4.3 MMOL/L (ref 3.5–5.1)
PROT SERPL-MCNC: 7.6 G/DL (ref 6.4–8.2)
RBC # BLD AUTO: 4.47 M/UL (ref 3.8–5.2)
RBC MORPH BLD: NORMAL
SODIUM SERPL-SCNC: 141 MMOL/L (ref 136–145)
WBC # BLD AUTO: 7.7 K/UL (ref 3.6–11)

## 2024-04-06 NOTE — TELEPHONE ENCOUNTER
No telephone call note from [facility] staff or other [facility] provider noted in the chart. The pt has already had her Mammogram on 08/08/17 and US of her breast on 08/11/17, and she has had a surgical U/S guided Bx per chart review on 08/21/17. Pathology report not yet noted in [address] [place] [address].  Félix Grimes RN
Patient received a call from someone? We are trying to track who called.   Or are there any updates on her mammogram?
Routing to Dr Sreekanth Moore office for review.
Left

## 2025-06-03 PROCEDURE — 87086 URINE CULTURE/COLONY COUNT: CPT

## 2025-06-03 PROCEDURE — 81001 URINALYSIS AUTO W/SCOPE: CPT

## 2025-06-04 ENCOUNTER — HOSPITAL ENCOUNTER (OUTPATIENT)
Facility: HOSPITAL | Age: 65
Setting detail: SPECIMEN
Discharge: HOME | End: 2025-06-07

## 2025-06-04 LAB
APPEARANCE UR: CLEAR
BACTERIA URNS QL MICRO: ABNORMAL /HPF
BILIRUB UR QL: NEGATIVE
COLOR UR: ABNORMAL
EPITH CASTS URNS QL MICRO: ABNORMAL /LPF
GLUCOSE UR STRIP.AUTO-MCNC: NEGATIVE MG/DL
HGB UR QL STRIP: ABNORMAL
HYALINE CASTS URNS QL MICRO: ABNORMAL /LPF (ref 0–5)
KETONES UR QL STRIP.AUTO: NEGATIVE MG/DL
LEUKOCYTE ESTERASE UR QL STRIP.AUTO: ABNORMAL
NITRITE UR QL STRIP.AUTO: POSITIVE
PH UR STRIP: 5 (ref 5–8)
PROT UR STRIP-MCNC: NEGATIVE MG/DL
RBC #/AREA URNS HPF: ABNORMAL /HPF (ref 0–5)
SP GR UR REFRACTOMETRY: 1.02 (ref 1–1.03)
UROBILINOGEN UR QL STRIP.AUTO: 0.2 EU/DL (ref 0.2–1)
WBC URNS QL MICRO: ABNORMAL /HPF (ref 0–4)

## 2025-06-05 LAB
BACTERIA SPEC CULT: NORMAL
CC UR VC: NORMAL
SERVICE CMNT-IMP: NORMAL